# Patient Record
Sex: FEMALE | Race: WHITE
[De-identification: names, ages, dates, MRNs, and addresses within clinical notes are randomized per-mention and may not be internally consistent; named-entity substitution may affect disease eponyms.]

---

## 2017-01-06 VITALS
SYSTOLIC BLOOD PRESSURE: 112 MMHG | DIASTOLIC BLOOD PRESSURE: 39 MMHG | SYSTOLIC BLOOD PRESSURE: 112 MMHG | SYSTOLIC BLOOD PRESSURE: 112 MMHG | DIASTOLIC BLOOD PRESSURE: 39 MMHG | DIASTOLIC BLOOD PRESSURE: 39 MMHG

## 2017-05-26 ENCOUNTER — HOSPITAL ENCOUNTER (OUTPATIENT)
Dept: HOSPITAL 44 - RAD | Age: 47
End: 2017-05-26
Attending: PHYSICIAN ASSISTANT
Payer: COMMERCIAL

## 2017-05-26 DIAGNOSIS — M79.672: Primary | ICD-10-CM

## 2017-05-26 PROCEDURE — 73630 X-RAY EXAM OF FOOT: CPT

## 2017-05-26 NOTE — DIAGNOSTIC IMAGING REPORT
VALENTINE WANG 

[facility]

[id number] [facility].[address] [address]

 

 

 

 

Report Submission Date: May 26, 2017 1:34:01 PM CDT

Patient       Study

Name:   KISHA PHILLIPS       Date:   May 26, 2017 9:57:38 AM CDT

MRN:   [id number]       Modality Type:   CR

Gender:   O       Description:   LOWER EXTREMITY

:   68       Institution:   [facility]

Physician:   SMALL, VALENTINE

         

 

 

Left foot - three views 

Clinical history:  Pain for 1 week. 

Findings:  Examination of the left foot in plantar, lateral and oblique views 
demonstrates degenerative changes with narrowing of the interphalangeal joints.
  There is no evident fracture.  Bony structures are osteopenic for the patient'
s age.  Small calcaneal spurs are seen on the lateral view. 

Impression: 

1.  Degenerative changes and osteopenia. 

2.  No fracture.

 

Electronically signed on May 26, 2017 1:34:01 PM CDT by:

Ricky LE

## 2018-04-03 ENCOUNTER — HOSPITAL ENCOUNTER (OUTPATIENT)
Dept: HOSPITAL 44 - LABRHC | Age: 48
End: 2018-04-03
Attending: PHYSICIAN ASSISTANT
Payer: COMMERCIAL

## 2018-04-03 DIAGNOSIS — L98.9: Primary | ICD-10-CM

## 2019-01-30 ENCOUNTER — HOSPITAL ENCOUNTER (OUTPATIENT)
Dept: HOSPITAL 44 - LAB | Age: 49
End: 2019-01-30
Attending: NURSE PRACTITIONER
Payer: COMMERCIAL

## 2019-01-30 DIAGNOSIS — I10: Primary | ICD-10-CM

## 2019-01-30 DIAGNOSIS — R73.9: ICD-10-CM

## 2019-01-30 DIAGNOSIS — E03.8: ICD-10-CM

## 2019-01-30 LAB — EGFR (NON-AFRICAN): > 60

## 2019-01-30 PROCEDURE — 84481 FREE ASSAY (FT-3): CPT

## 2019-01-30 PROCEDURE — 84439 ASSAY OF FREE THYROXINE: CPT

## 2019-01-30 PROCEDURE — 83036 HEMOGLOBIN GLYCOSYLATED A1C: CPT

## 2019-01-30 PROCEDURE — 80053 COMPREHEN METABOLIC PANEL: CPT

## 2019-01-30 PROCEDURE — 80061 LIPID PANEL: CPT

## 2019-01-30 PROCEDURE — 84443 ASSAY THYROID STIM HORMONE: CPT

## 2019-01-30 PROCEDURE — 36415 COLL VENOUS BLD VENIPUNCTURE: CPT
